# Patient Record
Sex: MALE | Race: WHITE | NOT HISPANIC OR LATINO
[De-identification: names, ages, dates, MRNs, and addresses within clinical notes are randomized per-mention and may not be internally consistent; named-entity substitution may affect disease eponyms.]

---

## 2022-11-18 ENCOUNTER — APPOINTMENT (OUTPATIENT)
Dept: ORTHOPEDIC SURGERY | Facility: CLINIC | Age: 57
End: 2022-11-18

## 2022-11-18 VITALS — BODY MASS INDEX: 22.9 KG/M2 | HEIGHT: 70 IN | WEIGHT: 160 LBS

## 2022-11-18 DIAGNOSIS — Z78.9 OTHER SPECIFIED HEALTH STATUS: ICD-10-CM

## 2022-11-18 DIAGNOSIS — S92.302A FRACTURE OF UNSPECIFIED METATARSAL BONE(S), LEFT FOOT, INITIAL ENCOUNTER FOR CLOSED FRACTURE: ICD-10-CM

## 2022-11-18 PROCEDURE — 99203 OFFICE O/P NEW LOW 30 MIN: CPT

## 2022-11-18 NOTE — DATA REVIEWED
[Outside X-rays] : outside x-rays [Left] : left [Foot] : foot [I reviewed the films/CD] : I reviewed the films/CD [FreeTextEntry1] : displaced fx distal shaft 5th MT

## 2022-11-18 NOTE — DISCUSSION/SUMMARY
[de-identified] : Fracture is displaced, but doesn't seem to have lost much length, and doesn't seem angulated. May need ORIF, will have him see Dr. Waterman for surgical consultation

## 2022-11-18 NOTE — HISTORY OF PRESENT ILLNESS
[7] : 7 [4] : 4 [Sharp] : sharp [Frequent] : frequent [Leisure] : leisure [de-identified] : 11/18/22: 56 yo m here for the evaluation of his left foot; he reports he slipped and fell on the steps at home on 11/17/22. Immediate pain and swelling to the lateral foot. He was seen at [facility] where xrays done. NWB in post op shoe with crutches. No prior left foot injuries.  [] : no [FreeTextEntry1] : left foot [FreeTextEntry5] : pt states this injury happened today. OPt states he was walking up steps at his home and he slipped and fell.  [de-identified] : 11/18/2022 [facility] OROZCO [de-identified] : xray

## 2022-11-18 NOTE — PHYSICAL EXAM
[Left] : left foot and ankle [Moderate] : moderate swelling of lateral foot [] : metatarsal tenderness [5th] : 5th

## 2022-11-21 ENCOUNTER — APPOINTMENT (OUTPATIENT)
Dept: ORTHOPEDIC SURGERY | Facility: CLINIC | Age: 57
End: 2022-11-21

## 2023-05-31 ENCOUNTER — EMERGENCY (EMERGENCY)
Facility: HOSPITAL | Age: 58
LOS: 1 days | Discharge: HOME | End: 2023-05-31
Attending: EMERGENCY MEDICINE | Admitting: EMERGENCY MEDICINE
Payer: COMMERCIAL

## 2023-05-31 VITALS
WEIGHT: 154.32 LBS | HEIGHT: 70 IN | HEART RATE: 44 BPM | TEMPERATURE: 98 F | DIASTOLIC BLOOD PRESSURE: 57 MMHG | RESPIRATION RATE: 18 BRPM | OXYGEN SATURATION: 98 % | SYSTOLIC BLOOD PRESSURE: 101 MMHG

## 2023-05-31 VITALS
OXYGEN SATURATION: 98 % | TEMPERATURE: 98 F | RESPIRATION RATE: 18 BRPM | SYSTOLIC BLOOD PRESSURE: 112 MMHG | HEART RATE: 50 BPM | DIASTOLIC BLOOD PRESSURE: 60 MMHG

## 2023-05-31 PROCEDURE — 99284 EMERGENCY DEPT VISIT MOD MDM: CPT

## 2023-05-31 PROCEDURE — 71101 X-RAY EXAM UNILAT RIBS/CHEST: CPT

## 2023-05-31 PROCEDURE — 73110 X-RAY EXAM OF WRIST: CPT | Mod: 26,LT

## 2023-05-31 PROCEDURE — 71101 X-RAY EXAM UNILAT RIBS/CHEST: CPT | Mod: 26

## 2023-05-31 PROCEDURE — 73110 X-RAY EXAM OF WRIST: CPT

## 2023-05-31 PROCEDURE — 99053 MED SERV 10PM-8AM 24 HR FAC: CPT

## 2023-05-31 RX ORDER — IBUPROFEN 200 MG
600 TABLET ORAL ONCE
Refills: 0 | Status: COMPLETED | OUTPATIENT
Start: 2023-05-31 | End: 2023-05-31

## 2023-05-31 RX ADMIN — Medication 600 MILLIGRAM(S): at 03:37

## 2023-05-31 RX ADMIN — Medication 600 MILLIGRAM(S): at 03:41
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
## 2023-05-31 NOTE — ED PROVIDER NOTE - PATIENT PORTAL LINK FT
You can access the FollowMyHealth Patient Portal offered by [facility] by registering at the following website: [email or web address]. By joining [employer]’s FollowMyHealth portal, you will also be able to view your health information using other applications (apps) compatible with our system.
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
## 2023-05-31 NOTE — ED PROVIDER NOTE - CARE PROVIDER_API CALL
Anton Devine  Orthopaedic Surgery  [address] [place] [address]  Phone: [phone]  Fax: [phone]  Follow Up Time:
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
## 2023-05-31 NOTE — ED ADULT NURSE NOTE - NS PRO AD NO ADVANCE DIRECTIVE
Jonathan Calisriram Rae Patient Age: 28 year old  MESSAGE:   Jennifer with [employer] (patients employer) states they received paperwork today from  for accomodation at work.  Jennifer is requesting a call back for clarification of details.       Next and Last Visit with Provider and Department  Last visit with this provider: Visit date not found  Last visit with this department: Visit date not found    Next visit with this provider: Visit date not found  Next visit with this department: Visit date not found    WEIGHT AND HEIGHT:   Wt Readings from Last 1 Encounters:   08/30/18 86.6 kg (191 lb)     Ht Readings from Last 1 Encounters:   02/12/17 5' 7\" (1.702 m)     BMI Readings from Last 1 Encounters:   08/30/18 29.91 kg/m²       ALLERGIES: no known allergies.  Current Outpatient Medications   Medication   • diclofenac (VOLTAREN) 75 MG EC tablet   • ondansetron (ZOFRAN ODT) 8 MG disintegrating tablet   • omeprazole (PRILOSEC) 40 MG capsule   • sulfamethoxazole-trimethoprim (BACTRIM DS) 800-160 MG per tablet   • HYDROcodone-acetaminophen (NORCO) 5-325 MG per tablet   • sulfamethoxazole-trimethoprim (BACTRIM DS) 800-160 MG per tablet     No current facility-administered medications for this visit.      PHARMACY to use: ask pt           Pharmacy preference(s) on file:   [facility] [address] [place] [address] [id number] [facility] [address]  Phone: [phone] Fax: [phone]      CALL BACK INFO: Ok to leave response (including medical information) on answering machine  ROUTING: Patient's physician/staff        PCP: Pcp Not In System         INS: No account information available.   PATIENT ADDRESS:  [address] [facility] [address]    
Called Jennifer at Employer office, she states they got the information needed.   
Out of office today    Left message on voicemail    
No

## 2023-05-31 NOTE — ED PROVIDER NOTE - NSFOLLOWUPINSTRUCTIONS_ED_ALL_ED_FT
Wrist Sprain, Adult  A wrist sprain is a stretch or tear in the strong tissues that connect the wrist bones to each other. These strong tissues are called ligaments. There are three types of wrist sprains:  Grade 1. The ligament is stretched more than normal. There may be a minor amount of wrist pain.  Grade 2. The ligament is partially torn. You may be able to move your wrist, but not very much. There may be a moderate amount of wrist pain.  Grade 3. The ligament or ligaments are completely torn. You may find it difficult to move your wrist even a little. There may be a significant amount of wrist pain.  What are the causes?  This condition may be caused by using the wrist too much during sports, exercise, or work. It can also happen due to a fall or during an accident.    What increases the risk?  You are more likely to develop this condition if:  You had a previous wrist or arm injury.  You have poor wrist strength and flexibility.  You play contact sports, such as football or soccer.  You participate in sports that may result in a fall, such as skateboarding, biking, skiing, or snowboarding.  You do not exercise regularly.  You use exercise equipment that does not fit well.  What are the signs or symptoms?  Symptoms of this condition include:  Pain in the wrist, arm, or hand.  Swelling or bruised skin near the wrist, hand, or arm. The skin may look yellow or blue.  Stiffness or trouble moving the hand.  Hearing a noise, like a pop or a snap, at the time of injury, or feeling a tear at the time of the injury.  A warm feeling in the skin around the wrist.  How is this diagnosed?  This condition is diagnosed with a physical exam. Sometimes an X-ray is taken to make sure a bone did not break. You may also have an MRI of your wrist to check for torn ligaments.    How is this treated?  This condition is treated by resting and applying ice to your wrist. Additional treatment may include:  Taking medicine for pain and inflammation.  Wearing a splint, brace, or cast for a short period of time to keep your wrist from moving (immobilized).  Doing exercises to strengthen and stretch your wrist.  Having surgery. This may be done if the ligament is completely torn.  Follow these instructions at home:  If you have a splint or brace:    Wear the splint or brace as told by your health care provider. Remove it only as told by your health care provider.  Loosen it if your fingers tingle, become numb, or turn cold and blue.  Keep it clean.  If the splint or brace is not waterproof:  Do not let it get wet.  Cover it with a watertight covering when you take a bath or a shower.  If you have a cast:    Do not put pressure on any part of the cast until it is fully hardened. This may take several hours.  Do not stick anything inside the cast to scratch your skin. Doing that increases your risk of infection.  Check the skin around the cast every day. Tell your health care provider about any concerns.  You may put lotion on dry skin around the edges of the cast. Do not put lotion on the skin underneath the cast.  Keep it clean.  If the cast is not waterproof:  Do not let it get wet.  Cover it with a watertight covering when you take a bath or shower.  Managing pain, stiffness, and swelling      If directed, put ice on the injured area. To do this:  If you have a removable splint or brace, remove it as told by your health care provider.  Put ice in a plastic bag.  Place a towel between your skin and the bag or between the splint or cast and the bag.  Leave the ice on for 20 minutes, 2–3 times a day.  Remove the ice if your skin turns bright red. This is very important. If you cannot feel pain, heat, or cold, you have a greater risk of damage to the area.  Move your fingers often to reduce stiffness and swelling.  Raise (elevate) the injured area above the level of your heart while you are sitting or lying down.  Activity    Rest your wrist as told by your health care provider. Do not do things that cause pain.  Ask your health care provider when it is safe to drive if you have a splint, brace, or cast on your wrist.  Do exercises as told by your health care provider.  Return to your normal activities as told by your health care provider. Ask your health care provider what activities are safe for you.  General instructions    Take over-the-counter and prescription medicines only as told by your health care provider.  Do not use any products that contain nicotine or tobacco, such as cigarettes, e-cigarettes, and chewing tobacco. These can delay healing. If you need help quitting, ask your health care provider.  Keep all follow-up visits. This is important.  Contact a health care provider if:  Your pain, bruising, or swelling gets worse.  Your skin becomes red, gets a rash, or has open sores.  Your pain does not get better or it gets worse.  Get help right away if:  You have a new or sudden sharp pain in the hand, arm, or wrist.  You have tingling or numbness in your hand.  Your fingers turn white, very red, or cold and blue.  You cannot move your fingers.  Summary  A wrist sprain is damage to ligaments in your wrist.  Wrist sprains can range from mild to severe.  Return to your normal activities as told by your health care provider. Ask your health care provider what activities are safe for you.  You may need to wear a splint, brace, or cast for a short period of time.  This information is not intended to replace advice given to you by your health care provider. Make sure you discuss any questions you have with your health care provider.

## 2023-05-31 NOTE — ED ADULT NURSE NOTE - NSFALLUNIVINTERV_ED_ALL_ED
Bed/Stretcher in lowest position, wheels locked, appropriate side rails in place/Call bell, personal items and telephone in reach/Instruct patient to call for assistance before getting out of bed/chair/stretcher/Non-slip footwear applied when patient is off stretcher/[place] to call system/Physically safe environment - no spills, clutter or unnecessary equipment/Purposeful proactive rounding/Room/bathroom lighting operational, light cord in reach

## 2024-12-02 NOTE — ED ADULT NURSE NOTE - AS SC BRADEN ACTIVITY
Pt's son-in-law calling in, discharged from ED yesterday and referred to pain management. Has back pain. Pain meds not helping. Rates pain 10/10. Having some abdominal pain in lower abdomen. Dispo is ED now. Son verbalized understanding. Advised to call back with further concerns.    Reason for Disposition   Abdominal pain and age > 60 years    Additional Information   Negative: Passed out (e.g., fainted, lost consciousness, blacked out and was not responding)   Negative: Shock suspected (e.g., cold/pale/clammy skin, too weak to stand, low BP, rapid pulse)   Negative: Sounds like a life-threatening emergency to the triager   Negative: SEVERE back pain of sudden onset and age > 60 years   Negative: SEVERE abdominal pain (e.g., excruciating)    Protocols used: Back Pain-A-OH    
(4) walks frequently